# Patient Record
Sex: FEMALE | Race: WHITE | NOT HISPANIC OR LATINO | Employment: UNEMPLOYED | URBAN - METROPOLITAN AREA
[De-identification: names, ages, dates, MRNs, and addresses within clinical notes are randomized per-mention and may not be internally consistent; named-entity substitution may affect disease eponyms.]

---

## 2019-02-21 ENCOUNTER — OFFICE VISIT (OUTPATIENT)
Dept: NEUROSURGERY | Facility: CLINIC | Age: 29
End: 2019-02-21

## 2019-02-21 VITALS — WEIGHT: 235 LBS

## 2019-02-21 DIAGNOSIS — M47.816 FACET ARTHRITIS OF LUMBAR REGION: ICD-10-CM

## 2019-02-21 DIAGNOSIS — M51.36 DEGENERATIVE LUMBAR DISC: Primary | ICD-10-CM

## 2019-02-21 PROCEDURE — 99243 OFF/OP CNSLTJ NEW/EST LOW 30: CPT | Performed by: NEUROLOGICAL SURGERY

## 2019-02-21 RX ORDER — CYCLOBENZAPRINE HCL 10 MG
TABLET ORAL 3 TIMES DAILY PRN
Refills: 0 | COMMUNITY
Start: 2019-02-16 | End: 2019-10-14

## 2019-02-21 RX ORDER — VALACYCLOVIR HYDROCHLORIDE 1 G/1
TABLET, FILM COATED ORAL
Refills: 0 | COMMUNITY
Start: 2018-12-19 | End: 2021-09-30

## 2019-02-21 RX ORDER — DICYCLOMINE HCL 20 MG
TABLET ORAL EVERY 6 HOURS
Refills: 0 | COMMUNITY
Start: 2018-12-10 | End: 2019-10-14

## 2019-02-21 RX ORDER — BUSPIRONE HYDROCHLORIDE 10 MG/1
10 TABLET ORAL 2 TIMES DAILY PRN
Refills: 0 | Status: ON HOLD | COMMUNITY
Start: 2019-01-17 | End: 2019-07-01

## 2019-02-21 RX ORDER — PRENATAL VIT/IRON FUM/FOLIC AC 28MG-0.8MG
1 TABLET ORAL DAILY
Refills: 0 | Status: ON HOLD | COMMUNITY
Start: 2019-01-03 | End: 2019-07-01

## 2019-02-21 RX ORDER — LORATADINE 10 MG/1
TABLET ORAL
Refills: 0 | Status: ON HOLD | COMMUNITY
Start: 2018-12-23 | End: 2019-07-01

## 2019-02-21 RX ORDER — DICLOFENAC SODIUM 75 MG/1
75 TABLET, DELAYED RELEASE ORAL DAILY
Refills: 0 | COMMUNITY
Start: 2019-02-16 | End: 2019-10-14

## 2019-02-21 RX ORDER — GABAPENTIN 600 MG/1
300 TABLET ORAL 3 TIMES DAILY
Refills: 0 | COMMUNITY
Start: 2019-01-17 | End: 2021-09-30

## 2019-02-21 RX ORDER — HYDROCODONE BITARTRATE AND ACETAMINOPHEN 5; 325 MG/1; MG/1
TABLET ORAL
Refills: 0 | Status: ON HOLD | COMMUNITY
Start: 2019-01-08 | End: 2019-07-01

## 2019-02-21 RX ORDER — LEVONORGESTREL AND ETHINYL ESTRADIOL 0.15-0.03
KIT ORAL
Refills: 1 | Status: ON HOLD | COMMUNITY
Start: 2019-02-16 | End: 2019-07-01

## 2019-02-21 RX ORDER — CITALOPRAM 20 MG/1
20 TABLET ORAL DAILY
Refills: 0 | COMMUNITY
Start: 2019-01-14 | End: 2021-09-30

## 2019-02-21 RX ORDER — IBUPROFEN 800 MG/1
800 TABLET ORAL EVERY 8 HOURS PRN
Refills: 0 | Status: ON HOLD | COMMUNITY
Start: 2019-01-15 | End: 2019-07-01

## 2019-02-21 RX ORDER — BUTALBITAL, ACETAMINOPHEN AND CAFFEINE 50; 325; 40 MG/1; MG/1; MG/1
TABLET ORAL EVERY 6 HOURS PRN
Refills: 0 | COMMUNITY
Start: 2019-02-16 | End: 2019-10-14

## 2019-02-21 RX ORDER — CHLORPHENIRAMINE MALEATE 4 MG/1
TABLET ORAL
Refills: 0 | COMMUNITY
Start: 2019-01-03 | End: 2019-08-19

## 2019-02-21 NOTE — PROGRESS NOTES
Subjective   Patient ID: Noni Lipscomb is a 28 y.o. female is being seen for consultation today at the request of RAJEEV Mcgraw*  Chief Complaint: Back pain    History of Present Illness: The patient is a 28-year-old woman with a back pain syndrome who had a lumbar interbody fusion L5-S1 with pedicle screws 10 years ago in 2009 in [place] by Dr. Kp Maya.  The reason for the fusion at age 18 is not listed, but she developed progressive pain somewhere in the past 5 years and began pain management in 2016.  She was in pain management at [facility], but that practice is closed and she now no longer has a pain management provider.  Pain tends to be in her lower back and radiates at times to her hip.  During fluoroscopy for a pain management procedure she was told that 1 of the pedicle screws had a break in it.  She was not able to get back in to her neurosurgeon in [place].    Review of Radiographic Studies:  Lumbar MRI scan shows prior interbody fusion at L5-S1 with an interbody cage and pedicle screws.  L4-5 shows facet arthropathy with a small facet synovial cyst on the left side.  There is no significant stenosis at this time at L4-5.  It is indeterminate whether there is a pseudoarthrosis at L5-S1.    The following portions of the patient's history were reviewed, updated as appropriate and approved: allergies, current medications, past family history, past medical history, past social history, past surgical history, review of systems and problem list.  Review of Systems   Constitutional: Negative for activity change, appetite change, chills, diaphoresis, fatigue, fever and unexpected weight change.   HENT: Negative for congestion, dental problem, drooling, ear discharge, ear pain, facial swelling, hearing loss, mouth sores, nosebleeds, postnasal drip, rhinorrhea, sinus pressure, sneezing, sore throat, tinnitus, trouble swallowing and voice change.    Eyes: Negative for photophobia, pain, discharge,  redness, itching and visual disturbance.   Respiratory: Negative for apnea, cough, choking, chest tightness, shortness of breath, wheezing and stridor.    Cardiovascular: Negative for chest pain, palpitations and leg swelling.   Gastrointestinal: Negative for abdominal distention, abdominal pain, anal bleeding, blood in stool, constipation, diarrhea, nausea, rectal pain and vomiting.   Endocrine: Negative for cold intolerance, heat intolerance, polydipsia, polyphagia and polyuria.   Genitourinary: Negative for decreased urine volume, difficulty urinating, dysuria, enuresis, flank pain, frequency, genital sores, hematuria and urgency.   Musculoskeletal: Positive for back pain. Negative for arthralgias, gait problem, joint swelling, myalgias, neck pain and neck stiffness.   Skin: Negative for color change, pallor, rash and wound.   Allergic/Immunologic: Negative for environmental allergies, food allergies and immunocompromised state.   Neurological: Negative for dizziness, tremors, seizures, syncope, facial asymmetry, speech difficulty, weakness, light-headedness, numbness and headaches.   Hematological: Negative for adenopathy. Does not bruise/bleed easily.   Psychiatric/Behavioral: Negative for agitation, behavioral problems, confusion, decreased concentration, dysphoric mood, hallucinations, self-injury, sleep disturbance and suicidal ideas. The patient is not nervous/anxious and is not hyperactive.    All other systems reviewed and are negative.      Objective     NEUROLOGICAL EXAMINATION:      MENTAL STATUS:  Alert and oriented.  Speech intact.  Recent and remote memory intact.      CRANIAL NERVES:  Cranial nerve II:  Visual fields are full.  Cranial nerves III, IV and VI:  PERRLADC.  Extraocular movements are intact.  Nystagmus is not present.  Cranial nerve V:  Facial sensation is intact.  Cranial nerve VII:  Muscles of facial expression reveal no asymmetry.  Cranial nerve VIII:  Hearing is intact.  Cranial  nerves IX and X:  Palate elevates symmetrically.  Cranial nerve XI:  Shoulder shrug is intact.  Cranial nerve XII:  Tongue is midline without evidence of atrophy or fasciculation.    MUSCULOSKELETAL: Mildly positive SLR on the right.    MOTOR: No weakness of dorsiflexion or plantarflexion on either side.    SENSATION: Intact to touch over the lower extremities.    REFLEXES:  DTR 1+ both knees and both ankles.    Assessment   Chronic back pain syndrome.  PLIF with pedicle screws L5-S1 10 years ago in [place].  Adjacent level L4-5 degenerative facet arthropathy, no segmental instability, no significant stenosis at this time.       Plan   CT of the lumbar spine to determine if there is pseudoarthrosis at L5-S1.  X-ray of the lumbar spine with lateral flexion-extension view to determine if there is any instability at L4-5 or L5-S1, and help define the pedicle screws.  Referral to Dr. Powers in [place] for pain management.       Júnior Anaya MD

## 2019-03-01 ENCOUNTER — TRANSCRIBE ORDERS (OUTPATIENT)
Dept: PAIN MEDICINE | Facility: CLINIC | Age: 29
End: 2019-03-01

## 2019-03-01 DIAGNOSIS — M54.5 LOW BACK PAIN, UNSPECIFIED BACK PAIN LATERALITY, UNSPECIFIED CHRONICITY, WITH SCIATICA PRESENCE UNSPECIFIED: Primary | ICD-10-CM

## 2019-05-23 ENCOUNTER — OFFICE VISIT (OUTPATIENT)
Dept: NEUROSURGERY | Facility: CLINIC | Age: 29
End: 2019-05-23

## 2019-05-23 VITALS
OXYGEN SATURATION: 98 % | HEART RATE: 114 BPM | HEIGHT: 66 IN | RESPIRATION RATE: 20 BRPM | SYSTOLIC BLOOD PRESSURE: 102 MMHG | WEIGHT: 241 LBS | DIASTOLIC BLOOD PRESSURE: 76 MMHG | BODY MASS INDEX: 38.73 KG/M2

## 2019-05-23 DIAGNOSIS — M53.2X6 SPINAL INSTABILITY OF LUMBAR REGION: ICD-10-CM

## 2019-05-23 DIAGNOSIS — M71.30 SYNOVIAL CYST: ICD-10-CM

## 2019-05-23 DIAGNOSIS — S32.009K LUMBAR PSEUDOARTHROSIS: Primary | ICD-10-CM

## 2019-05-23 PROCEDURE — 99213 OFFICE O/P EST LOW 20 MIN: CPT | Performed by: NEUROLOGICAL SURGERY

## 2019-05-23 NOTE — PROGRESS NOTES
Subjective   Noni Lipscomb is a 28 y.o. female who presents for follow up of back and left hip pain.     The patient is a 36-year-old woman who had an L5-S1 fusion in [place] in 2009.  She has had progressive back pain with right hip pain for 5 years and began having pain management in 2016.  In the past year she has developed left hip pain radiating to her left leg.    Lumbar MRI scan previously reviewed showed interbody fusion L5-S1 with pedicle screws, and L4-5 facet arthropathy with a small facet synovial cyst on the left but no apparent significant stenosis at L4-5 this time.    Lumbar CT scan was done and reviewed on this visit along with lumbar flexion-extension x-rays.  The lumbar x-rays show sacral screw fracture bilaterally at the entry point of the screw into the sacrum.  There is a single interbody cage placed from a left-sided approach.  There is no significant calcification within the disc space, although there is some bony density within the interior of the cage, but radiographically I think this may represent a pseudoarthrosis.  The lateral flexion-extension views do not show any visible movement at the lumbosacral level.    A referral was made to Dr. Powers in [place] for pain management, but she decided not to have this because she has not had a good experience with spinal injections previously.    The following portions of the patient's history were reviewed, updated as appropriate and approved: allergies, current medications, past family history, past medical history, past social history, past surgical history, review of systems and problem list.     Review of Systems   Constitutional: Negative for activity change, appetite change, chills, diaphoresis, fatigue, fever and unexpected weight change.   HENT: Negative for congestion, dental problem, drooling, ear discharge, ear pain, facial swelling, hearing loss, mouth sores, nosebleeds, postnasal drip, rhinorrhea, sinus pressure, sneezing, sore  throat, tinnitus, trouble swallowing and voice change.    Eyes: Negative for photophobia, pain, discharge, redness, itching and visual disturbance.   Respiratory: Negative for apnea, cough, choking, chest tightness, shortness of breath, wheezing and stridor.    Cardiovascular: Negative for chest pain, palpitations and leg swelling.   Gastrointestinal: Positive for abdominal pain and nausea. Negative for abdominal distention, anal bleeding, blood in stool, constipation, diarrhea, rectal pain and vomiting.   Endocrine: Negative for cold intolerance, heat intolerance, polydipsia, polyphagia and polyuria.   Genitourinary: Negative for decreased urine volume, difficulty urinating, dysuria, enuresis, flank pain, frequency, genital sores, hematuria and urgency.   Musculoskeletal: Positive for back pain. Negative for arthralgias, gait problem, joint swelling, myalgias, neck pain and neck stiffness.   Skin: Negative for color change, pallor, rash and wound.   Allergic/Immunologic: Negative for environmental allergies, food allergies and immunocompromised state.   Neurological: Positive for numbness and headaches. Negative for dizziness, tremors, seizures, syncope, facial asymmetry, speech difficulty, weakness and light-headedness.   Hematological: Negative for adenopathy. Does not bruise/bleed easily.   Psychiatric/Behavioral: Negative for agitation, behavioral problems, confusion, decreased concentration, dysphoric mood, hallucinations, self-injury, sleep disturbance and suicidal ideas. The patient is not nervous/anxious and is not hyperactive.        Objective    NEUROLOGICAL EXAMINATION:    Mildly positive SLR bilaterally.  No motor or sensory loss.  1+ reflexes in both knees and ankles.    Assessment   Possible pseudoarthrosis L5-S1.  Possible left L5 versus S1 radiculopathy.       Plan   Lumbar myelogram.  The objective is to determine whether there is any left sided L5 or S1 nerve root compression, and to help  determine whether the pseudoarthrosis which is possibly present may be the cause of the ongoing back pain.  I have explained that a revision fusion surgery could be done but the chance of success in long-term relief of back pain is only modest, and would require wide exposure of L5-S1 with revision of her S1 pedicle screws and posterior lateral fusion, utilizing BMP this time to ensure adequate bony fusion development.       Júnior Anaya MD

## 2019-06-17 PROBLEM — M71.30 SYNOVIAL CYST: Status: ACTIVE | Noted: 2019-06-17

## 2019-07-01 ENCOUNTER — HOSPITAL ENCOUNTER (OUTPATIENT)
Facility: HOSPITAL | Age: 29
Discharge: HOME | End: 2019-07-01
Attending: RADIOLOGY | Admitting: NEUROLOGICAL SURGERY

## 2019-07-01 VITALS
HEART RATE: 108 BPM | BODY MASS INDEX: 40.65 KG/M2 | RESPIRATION RATE: 16 BRPM | TEMPERATURE: 97.7 F | SYSTOLIC BLOOD PRESSURE: 143 MMHG | HEIGHT: 65 IN | WEIGHT: 244 LBS | DIASTOLIC BLOOD PRESSURE: 74 MMHG | OXYGEN SATURATION: 98 %

## 2019-07-01 DIAGNOSIS — M71.30 SYNOVIAL CYST: ICD-10-CM

## 2019-07-01 DIAGNOSIS — M53.2X6 SPINAL INSTABILITY OF LUMBAR REGION: ICD-10-CM

## 2019-07-01 DIAGNOSIS — S32.009K LUMBAR PSEUDOARTHROSIS: ICD-10-CM

## 2019-07-01 LAB
B-HCG UR QL: POSITIVE
HCG INTACT+B SERPL-ACNC: 487.9 MIU/ML

## 2019-07-01 PROCEDURE — 0 IOPAMIDOL 41 % SOLUTION: Performed by: RADIOLOGY

## 2019-07-01 PROCEDURE — 81025 URINE PREGNANCY TEST: CPT | Performed by: RADIOLOGY

## 2019-07-01 PROCEDURE — 81025 URINE PREGNANCY TEST: CPT

## 2019-07-01 PROCEDURE — 84702 CHORIONIC GONADOTROPIN TEST: CPT | Performed by: RADIOLOGY

## 2019-07-01 PROCEDURE — 62304 MYELOGRAPHY LUMBAR INJECTION: CPT | Performed by: RADIOLOGY

## 2019-07-01 RX ORDER — OXYCODONE AND ACETAMINOPHEN 7.5; 3 MG/1; MG/1
1 TABLET ORAL EVERY 8 HOURS PRN
COMMUNITY
End: 2021-09-30

## 2019-07-01 RX ORDER — LIDOCAINE HYDROCHLORIDE 10 MG/ML
INJECTION, SOLUTION EPIDURAL; INFILTRATION; INTRACAUDAL; PERINEURAL AS NEEDED
Status: DISCONTINUED | OUTPATIENT
Start: 2019-07-01 | End: 2019-07-01 | Stop reason: HOSPADM

## 2019-07-01 NOTE — H&P
"NAME: ASHLEY CHAPMAN  : 1990  PCP: Montrell Villegas MD  Attending MD: No att. providers found    Date of Admission:  2019  Date of Service: 2019    History of Present Illness:  28 y.o.  female with back and left hip pain.  She has had a prior L5/S1 fusion in [place] in .  She has had right hip pain for the past 5 years or so, but has more recently developed a left hip pain, radiating into her leg.    Past Medical History:  History reviewed. No pertinent past medical history.    Past Surgical History:  Past Surgical History:   Procedure Laterality Date   •  SECTION      x 3   • DILATATION AND CURETTAGE     • LUMBAR DISC SURGERY     • TONSILLECTOMY     • WISDOM TOOTH EXTRACTION           Medications  No medications prior to admission.       Allergies:  Allergies   Allergen Reactions   • Morphine Other (See Comments)     Previous reaction   • Latex Rash       Social Hx:  Social History     Socioeconomic History   • Marital status:      Spouse name: Not on file   • Number of children: Not on file   • Years of education: Not on file   • Highest education level: Not on file   Tobacco Use   • Smoking status: Current Every Day Smoker     Packs/day: 0.50     Types: Cigarettes   • Smokeless tobacco: Never Used   Substance and Sexual Activity   • Drug use: No       Family Hx:  History reviewed. No pertinent family history.    Review of Imaging:  Per Dr. Anaya's note:  \"Lumbar MRI scan previously reviewed showed interbody fusion L5-S1 with pedicle screws, and L4-5 facet arthropathy with a small facet synovial cyst on the left but no apparent significant stenosis at L4-5 this time    Laboratory Result:  No results found for: WBC, HGB, HCT, MCV, PLT  No results found for: GLUCOSE, CALCIUM, NA, K, CO2, CL, BUN, CREATININE, EGFRIFAFRI, EGFRIFNONA, BCR, ANIONGAP  No results found for: HGBA1C  No results found for: CHOL, CHLPL, TRIG, HDL, LDL, LDLDIRECT    Physical Examination:  Vitals:    " 07/01/19 1122   BP: 143/74   Pulse: 108   Resp: 16   Temp:    SpO2: 98%        General Appearance:   Well developed, well nourished, well groomed, alert, and cooperative.  Cardiovascular: Regular rate and rhythm.     Neurological examination:  Mildly positive SLR bilaterally.  No motor or sensory loss.  1+ reflexes in both knees and ankles.      Diagnoses/Plan:    Ms. Lipscomb is a 28 y.o. female new, progressive left hip pain radiating down the left leg.  She has possible pseudoarthrosis at the L5/S1 level, and presents for lumbar myelography to evaluate her radicular complaints for possible intervention.

## 2019-07-17 ENCOUNTER — TRANSCRIBE ORDERS (OUTPATIENT)
Dept: WOMENS IMAGING | Facility: HOSPITAL | Age: 29
End: 2019-07-17

## 2019-07-17 DIAGNOSIS — Z98.1 HISTORY OF LUMBAR FUSION: ICD-10-CM

## 2019-07-17 DIAGNOSIS — O09.891 MEDICATION EXPOSURE DURING FIRST TRIMESTER OF PREGNANCY: Primary | ICD-10-CM

## 2019-07-17 DIAGNOSIS — Z98.891 HISTORY OF C-SECTION: ICD-10-CM

## 2019-08-19 ENCOUNTER — TELEPHONE (OUTPATIENT)
Dept: WOMENS IMAGING | Facility: HOSPITAL | Age: 29
End: 2019-08-19

## 2019-08-19 ENCOUNTER — HOSPITAL ENCOUNTER (OUTPATIENT)
Dept: WOMENS IMAGING | Facility: HOSPITAL | Age: 29
Discharge: HOME | End: 2019-08-19
Admitting: NURSE PRACTITIONER

## 2019-08-19 ENCOUNTER — OFFICE VISIT (OUTPATIENT)
Dept: OBSTETRICS AND GYNECOLOGY | Facility: HOSPITAL | Age: 29
End: 2019-08-19

## 2019-08-19 VITALS
BODY MASS INDEX: 42.03 KG/M2 | DIASTOLIC BLOOD PRESSURE: 62 MMHG | WEIGHT: 246.2 LBS | HEIGHT: 64 IN | SYSTOLIC BLOOD PRESSURE: 107 MMHG

## 2019-08-19 DIAGNOSIS — Z98.1 HISTORY OF LUMBAR FUSION: ICD-10-CM

## 2019-08-19 DIAGNOSIS — O34.219 PREVIOUS CESAREAN DELIVERY AFFECTING PREGNANCY: Primary | ICD-10-CM

## 2019-08-19 DIAGNOSIS — M54.5 CHRONIC LOW BACK PAIN, UNSPECIFIED BACK PAIN LATERALITY, WITH SCIATICA PRESENCE UNSPECIFIED: ICD-10-CM

## 2019-08-19 DIAGNOSIS — Z34.90 PREGNANCY, UNSPECIFIED GESTATIONAL AGE: ICD-10-CM

## 2019-08-19 DIAGNOSIS — Z98.891 HISTORY OF C-SECTION: ICD-10-CM

## 2019-08-19 DIAGNOSIS — E66.01 MORBID OBESITY WITH BMI OF 40.0-44.9, ADULT (HCC): ICD-10-CM

## 2019-08-19 DIAGNOSIS — O09.891 MEDICATION EXPOSURE DURING FIRST TRIMESTER OF PREGNANCY: ICD-10-CM

## 2019-08-19 DIAGNOSIS — G89.29 CHRONIC LOW BACK PAIN, UNSPECIFIED BACK PAIN LATERALITY, WITH SCIATICA PRESENCE UNSPECIFIED: ICD-10-CM

## 2019-08-19 PROBLEM — M54.50 CHRONIC LOW BACK PAIN: Status: ACTIVE | Noted: 2019-08-19

## 2019-08-19 PROCEDURE — 76801 OB US < 14 WKS SINGLE FETUS: CPT

## 2019-08-19 PROCEDURE — 76801 OB US < 14 WKS SINGLE FETUS: CPT | Performed by: OBSTETRICS & GYNECOLOGY

## 2019-08-19 NOTE — TELEPHONE ENCOUNTER
Left voice message informing patient of appointment with Dr. Cole Wednesday, Augsust 21st @ 9 am, [address], [address]. Advised her to arrive by 8:30 am to complete paperwork and registration. Also provided his phone number ([phone]).

## 2019-08-19 NOTE — PROGRESS NOTES
"Next OB visit is 9/27/2019. Reports PCP was prescribing narcotics and Gabapentin \"but he got shut down\". States Ms. Palencia has prescribed Gabapentin. She has weaned off Percocet per Dr. Au's instructions and has been taking some Ibuprofen for chronic back pain. Sees Dr. Anaya for neurosurgical care. States is here because she needs management of chronic back pain.   "

## 2019-08-19 NOTE — PROGRESS NOTES
Spoke with Pao in Dr. Cole's office regarding referral for chronic back pain. She will check with Dr. Cole to see if they can see her any sooner than their next available appointment in October and let us know something.

## 2019-08-22 ENCOUNTER — TELEPHONE (OUTPATIENT)
Dept: WOMENS IMAGING | Facility: HOSPITAL | Age: 29
End: 2019-08-22

## 2019-08-22 NOTE — TELEPHONE ENCOUNTER
Returned pt's call regarding missing her referral appt. Pt stated that she has already scheduled an appt for Oct but will plan on calling that office weekly to see if there are any appt cancellations.

## 2019-08-22 NOTE — TELEPHONE ENCOUNTER
----- Message from James Hurt sent at 8/22/2019  8:34 AM EDT -----  Regarding: missed appointment  Contact: [phone]  Patient called missed call from Dr. Cole's office and missed appointment, she called to reschedule and they don't have anything until 10-23-19 at 10 am.

## 2019-10-14 ENCOUNTER — OFFICE VISIT (OUTPATIENT)
Dept: OBSTETRICS AND GYNECOLOGY | Facility: HOSPITAL | Age: 29
End: 2019-10-14

## 2019-10-14 ENCOUNTER — HOSPITAL ENCOUNTER (OUTPATIENT)
Dept: WOMENS IMAGING | Facility: HOSPITAL | Age: 29
Discharge: HOME | End: 2019-10-14
Admitting: OBSTETRICS & GYNECOLOGY

## 2019-10-14 VITALS — DIASTOLIC BLOOD PRESSURE: 51 MMHG | SYSTOLIC BLOOD PRESSURE: 108 MMHG | WEIGHT: 251 LBS | BODY MASS INDEX: 43.77 KG/M2

## 2019-10-14 DIAGNOSIS — M54.50 CHRONIC LOW BACK PAIN: ICD-10-CM

## 2019-10-14 DIAGNOSIS — O34.219 PREVIOUS CESAREAN DELIVERY AFFECTING PREGNANCY: ICD-10-CM

## 2019-10-14 DIAGNOSIS — Z34.90 PREGNANCY, UNSPECIFIED GESTATIONAL AGE: ICD-10-CM

## 2019-10-14 DIAGNOSIS — E66.01 MORBID OBESITY WITH BMI OF 40.0-44.9, ADULT (HCC): Primary | ICD-10-CM

## 2019-10-14 DIAGNOSIS — E66.01 MORBID OBESITY WITH BMI OF 40.0-44.9, ADULT (HCC): ICD-10-CM

## 2019-10-14 DIAGNOSIS — G89.29 CHRONIC LOW BACK PAIN: ICD-10-CM

## 2019-10-14 PROCEDURE — 76811 OB US DETAILED SNGL FETUS: CPT

## 2019-10-14 PROCEDURE — 76811 OB US DETAILED SNGL FETUS: CPT | Performed by: OBSTETRICS & GYNECOLOGY

## 2019-10-14 NOTE — PROGRESS NOTES
Documentation of the ultasound findings, images, and interpretations with be available in the patient's Viewpoint report located in the Chart Review Imaging tab in [facility].

## 2019-10-14 NOTE — PROGRESS NOTES
Denies problems. Reports had NIPS with normal results. Next OB visit is 10/17/2019. States Dr. Horn is prescribing Percocet for her back pain until she can see Dr. Cole on 10/23/2019. Has decreased Neurontin dose to 300 mg once daily.

## 2019-12-09 ENCOUNTER — HOSPITAL ENCOUNTER (OUTPATIENT)
Dept: WOMENS IMAGING | Facility: HOSPITAL | Age: 29
Discharge: HOME | End: 2019-12-09
Admitting: OBSTETRICS & GYNECOLOGY

## 2019-12-09 ENCOUNTER — OFFICE VISIT (OUTPATIENT)
Dept: OBSTETRICS AND GYNECOLOGY | Facility: HOSPITAL | Age: 29
End: 2019-12-09

## 2019-12-09 VITALS — DIASTOLIC BLOOD PRESSURE: 53 MMHG | WEIGHT: 253.2 LBS | BODY MASS INDEX: 44.15 KG/M2 | SYSTOLIC BLOOD PRESSURE: 111 MMHG

## 2019-12-09 DIAGNOSIS — E66.01 MORBID OBESITY WITH BMI OF 40.0-44.9, ADULT (HCC): ICD-10-CM

## 2019-12-09 DIAGNOSIS — M54.50 CHRONIC LOW BACK PAIN, UNSPECIFIED BACK PAIN LATERALITY, UNSPECIFIED WHETHER SCIATICA PRESENT: ICD-10-CM

## 2019-12-09 DIAGNOSIS — O34.219 PREVIOUS CESAREAN DELIVERY AFFECTING PREGNANCY: ICD-10-CM

## 2019-12-09 DIAGNOSIS — Z34.90 PREGNANCY, UNSPECIFIED GESTATIONAL AGE: ICD-10-CM

## 2019-12-09 DIAGNOSIS — E66.01 MORBID OBESITY WITH BMI OF 40.0-44.9, ADULT (HCC): Primary | ICD-10-CM

## 2019-12-09 DIAGNOSIS — G89.29 CHRONIC LOW BACK PAIN, UNSPECIFIED BACK PAIN LATERALITY, UNSPECIFIED WHETHER SCIATICA PRESENT: ICD-10-CM

## 2019-12-09 PROCEDURE — 76816 OB US FOLLOW-UP PER FETUS: CPT | Performed by: OBSTETRICS & GYNECOLOGY

## 2019-12-09 PROCEDURE — 76816 OB US FOLLOW-UP PER FETUS: CPT

## 2019-12-09 NOTE — PROGRESS NOTES
Documentation of the ultasound findings, images, and interpretations with be available in the patient's Viewpoint report located in the Chart Review Imaging tab in [place].

## 2019-12-09 NOTE — PROGRESS NOTES
Denies problems other than chronic back pain issues and current URI symptoms (was seen by Urgent Care). States she had a consultation with Dr. Cole regarding back pain and he told her he couldn't do anything for her while she is pregnant. Reports that Dr. Horn is continuing to prescribe limited doses of Percocet but she has stopped Celexa and Gabapentin.

## 2020-06-15 ENCOUNTER — OFFICE VISIT (OUTPATIENT)
Dept: NEUROSURGERY | Facility: CLINIC | Age: 30
End: 2020-06-15

## 2020-06-15 VITALS
SYSTOLIC BLOOD PRESSURE: 126 MMHG | HEIGHT: 64 IN | DIASTOLIC BLOOD PRESSURE: 78 MMHG | TEMPERATURE: 97.3 F | BODY MASS INDEX: 40.29 KG/M2 | WEIGHT: 236 LBS

## 2020-06-15 DIAGNOSIS — E66.01 CLASS 3 SEVERE OBESITY DUE TO EXCESS CALORIES WITHOUT SERIOUS COMORBIDITY WITH BODY MASS INDEX (BMI) OF 40.0 TO 44.9 IN ADULT (HCC): ICD-10-CM

## 2020-06-15 DIAGNOSIS — M53.2X6 SPINAL INSTABILITY OF LUMBAR REGION: Primary | ICD-10-CM

## 2020-06-15 PROCEDURE — 99214 OFFICE O/P EST MOD 30 MIN: CPT | Performed by: PHYSICIAN ASSISTANT

## 2020-06-15 RX ORDER — DICLOFENAC SODIUM 75 MG/1
75 TABLET, DELAYED RELEASE ORAL 2 TIMES DAILY
COMMUNITY
End: 2021-09-30

## 2020-06-15 NOTE — H&P (VIEW-ONLY)
Patient: Noni Lipscomb  : 1990  Gender: female    Primary Care Provider: Berta Ferrer APRN      Chief Complaint:   Chief Complaint   Patient presents with   • Back Pain       History of Present Illness:  Noni Lipscomb is a 29-year-old woman with a history of L5-S1 fusion in  in [place].  She saw Dr. Anaya in May 2019 for left hip and leg pain.  At this time MRI demonstrated interbody fusion L5-S1, L4-5 facet arthropathy without significant stenosis.  Lumbar CT and plain films were reviewed and demonstrated a sacral screw fracture bilaterally at the entry point of the screw in the sacrum as well as some bone density within the inferior of the cage which Dr. Anaya felt was a possible pseudoarthrosis.  Lumbar myelogram was recommended although patient had a positive pregnancy test upon admission for the study.  She presents today for follow-up regarding these issues.    Presently Ms. Lipscomb reports that her symptoms are located in her sacral region bilaterally and radiate to both legs posteriorly.  She reports some numbness distal to the knee.  She denies groin numbness or bowel or bladder dysfunction.  She feels that her symptoms worsened after the birth of her child.  She takes Percocet, diclofenac, gabapentin.  She is followed by [facility] for pain management.  She has recently started smoking.  She has no new imaging to date.      Past Medical and Surgical History:  Past Medical History:   Diagnosis Date   • Anxiety 2019   • Asthma    • Chronic back pain 2019    s/p lumbar fusion   • Chronic narcotic use 2019    Percocet for chronic back pain   • History of abnormal cervical Pap smear    • Migraines     since 2014   • Obesity, Class III, BMI 40-49.9 (morbid obesity) (CMS/HCC) 2019   • Spondylolisthesis      Past Surgical History:   Procedure Laterality Date   • CERVICAL BIOPSY     •  SECTION      x 3   • DILATATION AND CURETTAGE     • DILATATION AND CURETTAGE   "01/07/2019    Incomplete SAB   • INTERVENTIONAL RADIOLOGY PROCEDURE N/A 7/1/2019    Procedure: myelogram lumbar spine;  Surgeon: Elkin Benz MD;  Location: [facility] INVASIVE LOCATION;  Service: Interventional Radiology   • LUMBAR DISC SURGERY     • TONSILLECTOMY     • WISDOM TOOTH EXTRACTION         Current Medications:    Current Outpatient Medications:   •  citalopram (CeleXA) 20 MG tablet, 20 mg Daily., Disp: , Rfl: 0  •  diclofenac (VOLTAREN) 75 MG EC tablet, Take 75 mg by mouth 2 (Two) Times a Day., Disp: , Rfl:   •  oxyCODONE-acetaminophen (LYNOX) 7.5-300 MG per tablet, Take 1 tablet by mouth Every 8 (Eight) Hours As Needed., Disp: , Rfl:   •  gabapentin (NEURONTIN) 600 MG tablet, Take 300 mg by mouth Daily. Only takes BID, Disp: , Rfl: 0  •  PRENATAL GUMMY VITAMIN, Chew 2 each Daily., Disp: , Rfl:   •  valACYclovir (VALTREX) 1000 MG tablet, take 2 tablets by mouth every 12 hours for 2 DOSES if needed for fever BLISTER, Disp: , Rfl: 0    Allergies:  Allergies   Allergen Reactions   • Morphine Hives and GI Intolerance     Duramorph; states is able to tolerate regular Morphine   • Latex Rash       Review of Systems:  Review of Systems   Genitourinary: Positive for pelvic pain.   Musculoskeletal: Positive for arthralgias and back pain.   Neurological: Positive for headache.   All other systems reviewed and are negative.        Physical Examination:  Vitals:    06/15/20 1326   BP: 126/78   Temp: 97.3 °F (36.3 °C)   Weight: 107 kg (236 lb)   Height: 161.3 cm (63.5\")       Physical Exam   Constitutional: She is oriented to person, place, and time. She appears well-developed and well-nourished. No distress.   Neck: Normal range of motion. Neck supple.   Musculoskeletal: Normal range of motion. She exhibits no edema, tenderness or deformity.   Neurological: She is alert and oriented to person, place, and time. She has normal strength. She displays no atrophy. A sensory deficit is present. Coordination and gait " normal.   Reflex Scores:       Bicep reflexes are 1+ on the right side and 1+ on the left side.       Brachioradialis reflexes are 1+ on the right side and 1+ on the left side.       Patellar reflexes are 1+ on the right side and 1+ on the left side.       Achilles reflexes are 1+ on the right side and 1+ on the left side.  Subjective numbness distal to the knee in the right lower extremity sparing the foot   Skin: Skin is warm and dry. She is not diaphoretic.   Psychiatric: She has a normal mood and affect.   Positive SLR on the left    Imaging Review:  Per Dr. Anaya's previous documentation: Lumbar MRI demonstrates interbody fusion at L5-S1 with pedicle screws, L4-5 facet arthropathy with a small synovial cyst on the left with no significant stenosis  Lumbar CT and plain films show sacral screw fractures bilaterally at the entry point of the screw into the sacrum with a single interbody cage placed from the left-sided approach.  Lateral flexion-extension views do not show any visible movement of the lumbosacral level.    Assessment:  1.  Low back pain with bilateral sciatica  2.  History of lumbar fusion L5-S1      Plan:  Noni Lipscomb is seen today for reevaluation regarding possible pseudoarthrosis and sacral screw fractures within her L5-S1 construct which was placed in 2009 in [place].  I have reviewed this with Dr. Hagan who would like to proceed with a lumbar myelogram and new flexion-extension plain films.  Patient will follow-up with Dr. Hagan following the studies.  Patient was directed to contact our office with any concerns in the interim.          Lalita Zamarripa PA-C

## 2020-06-15 NOTE — PROGRESS NOTES
Patient: Noni Lipscomb  : 1990  Gender: female    Primary Care Provider: Berta Ferrer APRN      Chief Complaint:   Chief Complaint   Patient presents with   • Back Pain       History of Present Illness:  Noni Lipscomb is a 29-year-old woman with a history of L5-S1 fusion in  in [place].  She saw Dr. Anaya in May 2019 for left hip and leg pain.  At this time MRI demonstrated interbody fusion L5-S1, L4-5 facet arthropathy without significant stenosis.  Lumbar CT and plain films were reviewed and demonstrated a sacral screw fracture bilaterally at the entry point of the screw in the sacrum as well as some bone density within the inferior of the cage which Dr. Anaya felt was a possible pseudoarthrosis.  Lumbar myelogram was recommended although patient had a positive pregnancy test upon admission for the study.  She presents today for follow-up regarding these issues.    Presently Ms. Lipscomb reports that her symptoms are located in her sacral region bilaterally and radiate to both legs posteriorly.  She reports some numbness distal to the knee.  She denies groin numbness or bowel or bladder dysfunction.  She feels that her symptoms worsened after the birth of her child.  She takes Percocet, diclofenac, gabapentin.  She is followed by [facility] for pain management.  She has recently started smoking.  She has no new imaging to date.      Past Medical and Surgical History:  Past Medical History:   Diagnosis Date   • Anxiety 2019   • Asthma    • Chronic back pain 2019    s/p lumbar fusion   • Chronic narcotic use 2019    Percocet for chronic back pain   • History of abnormal cervical Pap smear    • Migraines     since 2014   • Obesity, Class III, BMI 40-49.9 (morbid obesity) (CMS/HCC) 2019   • Spondylolisthesis      Past Surgical History:   Procedure Laterality Date   • CERVICAL BIOPSY     •  SECTION      x 3   • DILATATION AND CURETTAGE     • DILATATION AND CURETTAGE   "01/07/2019    Incomplete SAB   • INTERVENTIONAL RADIOLOGY PROCEDURE N/A 7/1/2019    Procedure: myelogram lumbar spine;  Surgeon: Elkin Benz MD;  Location: [facility] INVASIVE LOCATION;  Service: Interventional Radiology   • LUMBAR DISC SURGERY     • TONSILLECTOMY     • WISDOM TOOTH EXTRACTION         Current Medications:    Current Outpatient Medications:   •  citalopram (CeleXA) 20 MG tablet, 20 mg Daily., Disp: , Rfl: 0  •  diclofenac (VOLTAREN) 75 MG EC tablet, Take 75 mg by mouth 2 (Two) Times a Day., Disp: , Rfl:   •  oxyCODONE-acetaminophen (LYNOX) 7.5-300 MG per tablet, Take 1 tablet by mouth Every 8 (Eight) Hours As Needed., Disp: , Rfl:   •  gabapentin (NEURONTIN) 600 MG tablet, Take 300 mg by mouth Daily. Only takes BID, Disp: , Rfl: 0  •  PRENATAL GUMMY VITAMIN, Chew 2 each Daily., Disp: , Rfl:   •  valACYclovir (VALTREX) 1000 MG tablet, take 2 tablets by mouth every 12 hours for 2 DOSES if needed for fever BLISTER, Disp: , Rfl: 0    Allergies:  Allergies   Allergen Reactions   • Morphine Hives and GI Intolerance     Duramorph; states is able to tolerate regular Morphine   • Latex Rash       Review of Systems:  Review of Systems   Genitourinary: Positive for pelvic pain.   Musculoskeletal: Positive for arthralgias and back pain.   Neurological: Positive for headache.   All other systems reviewed and are negative.        Physical Examination:  Vitals:    06/15/20 1326   BP: 126/78   Temp: 97.3 °F (36.3 °C)   Weight: 107 kg (236 lb)   Height: 161.3 cm (63.5\")       Physical Exam   Constitutional: She is oriented to person, place, and time. She appears well-developed and well-nourished. No distress.   Neck: Normal range of motion. Neck supple.   Musculoskeletal: Normal range of motion. She exhibits no edema, tenderness or deformity.   Neurological: She is alert and oriented to person, place, and time. She has normal strength. She displays no atrophy. A sensory deficit is present. Coordination and gait " normal.   Reflex Scores:       Bicep reflexes are 1+ on the right side and 1+ on the left side.       Brachioradialis reflexes are 1+ on the right side and 1+ on the left side.       Patellar reflexes are 1+ on the right side and 1+ on the left side.       Achilles reflexes are 1+ on the right side and 1+ on the left side.  Subjective numbness distal to the knee in the right lower extremity sparing the foot   Skin: Skin is warm and dry. She is not diaphoretic.   Psychiatric: She has a normal mood and affect.   Positive SLR on the left    Imaging Review:  Per Dr. Anaya's previous documentation: Lumbar MRI demonstrates interbody fusion at L5-S1 with pedicle screws, L4-5 facet arthropathy with a small synovial cyst on the left with no significant stenosis  Lumbar CT and plain films show sacral screw fractures bilaterally at the entry point of the screw into the sacrum with a single interbody cage placed from the left-sided approach.  Lateral flexion-extension views do not show any visible movement of the lumbosacral level.    Assessment:  1.  Low back pain with bilateral sciatica  2.  History of lumbar fusion L5-S1      Plan:  Noni Lipscomb is seen today for reevaluation regarding possible pseudoarthrosis and sacral screw fractures within her L5-S1 construct which was placed in 2009 in [place].  I have reviewed this with Dr. Hagan who would like to proceed with a lumbar myelogram and new flexion-extension plain films.  Patient will follow-up with Dr. Hagan following the studies.  Patient was directed to contact our office with any concerns in the interim.          Lalita Zamarripa PA-C

## 2020-06-25 ENCOUNTER — HOSPITAL ENCOUNTER (OUTPATIENT)
Facility: HOSPITAL | Age: 30
Setting detail: HOSPITAL OUTPATIENT SURGERY
Discharge: HOME | End: 2020-06-25
Attending: RADIOLOGY | Admitting: RADIOLOGY

## 2020-06-25 ENCOUNTER — APPOINTMENT (OUTPATIENT)
Dept: CT IMAGING | Facility: HOSPITAL | Age: 30
End: 2020-06-25

## 2020-06-25 VITALS
OXYGEN SATURATION: 94 % | DIASTOLIC BLOOD PRESSURE: 109 MMHG | BODY MASS INDEX: 39.23 KG/M2 | RESPIRATION RATE: 16 BRPM | TEMPERATURE: 97.2 F | HEART RATE: 117 BPM | WEIGHT: 235.45 LBS | HEIGHT: 65 IN | SYSTOLIC BLOOD PRESSURE: 144 MMHG

## 2020-06-25 DIAGNOSIS — M53.2X6 SPINAL INSTABILITY OF LUMBAR REGION: ICD-10-CM

## 2020-06-25 LAB — B-HCG UR QL: NEGATIVE

## 2020-06-25 PROCEDURE — 81025 URINE PREGNANCY TEST: CPT | Performed by: RADIOLOGY

## 2020-06-25 PROCEDURE — 0 IOPAMIDOL 41 % SOLUTION: Performed by: RADIOLOGY

## 2020-06-25 PROCEDURE — 62304 MYELOGRAPHY LUMBAR INJECTION: CPT | Performed by: RADIOLOGY

## 2020-06-25 PROCEDURE — 72132 CT LUMBAR SPINE W/DYE: CPT

## 2020-06-25 RX ORDER — ALBUTEROL SULFATE 90 UG/1
2 AEROSOL, METERED RESPIRATORY (INHALATION) EVERY 4 HOURS PRN
COMMUNITY
End: 2021-09-30

## 2020-06-25 RX ORDER — OXYCODONE AND ACETAMINOPHEN 7.5; 325 MG/1; MG/1
1 TABLET ORAL ONCE AS NEEDED
Status: COMPLETED | OUTPATIENT
Start: 2020-06-25 | End: 2020-06-25

## 2020-06-25 RX ADMIN — OXYCODONE HYDROCHLORIDE AND ACETAMINOPHEN 1 TABLET: 7.5; 325 TABLET ORAL at 08:01

## 2020-07-07 ENCOUNTER — OFFICE VISIT (OUTPATIENT)
Dept: NEUROSURGERY | Facility: CLINIC | Age: 30
End: 2020-07-07

## 2020-07-07 VITALS — WEIGHT: 242.4 LBS | HEIGHT: 65 IN | BODY MASS INDEX: 40.39 KG/M2 | TEMPERATURE: 97.5 F

## 2020-07-07 DIAGNOSIS — M53.2X6 SPINAL INSTABILITY OF LUMBAR REGION: Primary | ICD-10-CM

## 2020-07-07 DIAGNOSIS — M47.816 FACET ARTHRITIS OF LUMBAR REGION: ICD-10-CM

## 2020-07-07 DIAGNOSIS — M54.50 CHRONIC LOW BACK PAIN, UNSPECIFIED BACK PAIN LATERALITY, UNSPECIFIED WHETHER SCIATICA PRESENT: ICD-10-CM

## 2020-07-07 DIAGNOSIS — G89.29 CHRONIC LOW BACK PAIN, UNSPECIFIED BACK PAIN LATERALITY, UNSPECIFIED WHETHER SCIATICA PRESENT: ICD-10-CM

## 2020-07-07 PROCEDURE — 99213 OFFICE O/P EST LOW 20 MIN: CPT | Performed by: NEUROLOGICAL SURGERY

## 2020-07-07 NOTE — PROGRESS NOTES
Noni Lipscomb  1990  [id number]                        CHIEF COMPLAINT:  [Back pain]         MEDICAL HISTORY SINCE LAST ENCOUNTER:  [This 29-year-old female reports today for further discussion and review of her diagnostic studies.  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  Her pain is primarily axial.  She has no true radiculopathy.  She had a myelogram performed and returns today for further discussion.  She describes her pain as being primarily in the sacroiliac joint region.]           Past Medical History:   Diagnosis Date   • Anxiety 2019   • Asthma    • Chronic back pain 2019    s/p lumbar fusion   • Chronic narcotic use 2019    Percocet for chronic back pain   • History of abnormal cervical Pap smear    • Migraines     since    • Obesity, Class III, BMI 40-49.9 (morbid obesity) (CMS/[facility]) 2019   • Spondylolisthesis               Past Surgical History:   Procedure Laterality Date   • CERVICAL BIOPSY     •  SECTION      x 3   • DILATATION AND CURETTAGE     • DILATATION AND CURETTAGE  2019    Incomplete SAB   • INTERVENTIONAL RADIOLOGY PROCEDURE N/A 2019    Procedure: myelogram lumbar spine;  Surgeon: Elkin Benz MD;  Location:  [facility] INVASIVE LOCATION;  Service: Interventional Radiology   • INTERVENTIONAL RADIOLOGY PROCEDURE N/A 2020    Procedure: IR myelogram lumbar with fluoroscopy;  Surgeon: Elkin Benz MD;  Location:  [facility] CATH INVASIVE LOCATION;  Service: Interventional Radiology;  Laterality: N/A;   • LUMBAR DISC SURGERY     • TONSILLECTOMY     • WISDOM TOOTH EXTRACTION                Family History   Problem Relation Age of Onset   • Hyperlipidemia Mother    • Heart disease Paternal Grandfather               Social History     Socioeconomic History   • Marital status:      Spouse name: Not on file   • Number of children: Not on file   • Years of education: Not on  file   • Highest education level: Not on file   Tobacco Use   • Smoking status: Current Some Day Smoker     Packs/day: 0.00     Types: Cigarettes     Last attempt to quit: 2019     Years since quittin.9   • Smokeless tobacco: Never Used   • Tobacco comment: 1-3pks a week   Substance and Sexual Activity   • Alcohol use: Yes     Comment: occasionally   • Drug use: No     Comment: chronic prescribed Percocet/Norco since 2016   • Sexual activity: Defer              Allergies   Allergen Reactions   • Morphine Hives and GI Intolerance     Duramorph; states is able to tolerate regular Morphine   • Latex Rash              Current Outpatient Medications:   •  albuterol sulfate  (90 Base) MCG/ACT inhaler, Inhale 2 puffs Every 4 (Four) Hours As Needed for Wheezing., Disp: , Rfl:   •  citalopram (CeleXA) 20 MG tablet, 20 mg Daily., Disp: , Rfl: 0  •  diclofenac (VOLTAREN) 75 MG EC tablet, Take 75 mg by mouth 2 (Two) Times a Day., Disp: , Rfl:   •  gabapentin (NEURONTIN) 600 MG tablet, Take 300 mg by mouth 3 (Three) Times a Day. Only takes BID, Disp: , Rfl: 0  •  oxyCODONE-acetaminophen (LYNOX) 7.5-300 MG per tablet, Take 1 tablet by mouth Every 8 (Eight) Hours As Needed., Disp: , Rfl:   •  valACYclovir (VALTREX) 1000 MG tablet, take 2 tablets by mouth every 12 hours for 2 DOSES if needed for fever BLISTER, Disp: , Rfl: 0         Review of Systems   Constitutional: Negative for activity change, appetite change, chills, diaphoresis, fatigue, fever and unexpected weight change.   HENT: Negative for congestion, dental problem, drooling, ear discharge, ear pain, facial swelling, hearing loss, mouth sores, nosebleeds, postnasal drip, rhinorrhea, sinus pressure, sneezing, sore throat, tinnitus, trouble swallowing and voice change.    Eyes: Negative for photophobia, pain, discharge, redness, itching and visual disturbance.   Respiratory: Negative for apnea, cough, choking, chest tightness, shortness of breath, wheezing  "and stridor.    Cardiovascular: Negative for chest pain, palpitations and leg swelling.   Gastrointestinal: Positive for abdominal pain and nausea. Negative for abdominal distention, anal bleeding, blood in stool, constipation, diarrhea, rectal pain and vomiting.   Endocrine: Negative for cold intolerance, heat intolerance, polydipsia, polyphagia and polyuria.   Genitourinary: Negative for decreased urine volume, difficulty urinating, dysuria, enuresis, flank pain, frequency, genital sores, hematuria and urgency.   Musculoskeletal: Positive for back pain. Negative for arthralgias, gait problem, joint swelling, myalgias, neck pain and neck stiffness.   Skin: Negative for color change, pallor, rash and wound.   Allergic/Immunologic: Negative for environmental allergies, food allergies and immunocompromised state.   Neurological: Positive for numbness and headaches. Negative for dizziness, tremors, seizures, syncope, facial asymmetry, speech difficulty, weakness and light-headedness.   Hematological: Negative for adenopathy. Does not bruise/bleed easily.   Psychiatric/Behavioral: Negative for agitation, behavioral problems, confusion, decreased concentration, dysphoric mood, hallucinations, self-injury, sleep disturbance and suicidal ideas. The patient is not nervous/anxious and is not hyperactive.    All other systems reviewed and are negative.              Vitals:    07/07/20 1418   Temp: 97.5 °F (36.4 °C)   TempSrc: Temporal   Weight: 110 kg (242 lb 6.4 oz)   Height: 165.1 cm (65\")               EXAMINATION: BMI 40.3, weight 242 pounds.  Limited range of motion.  Diminished reflexes in lower extremities.  Straight leg raising negative.  No weakness, sensory loss or reflex asymmetry.            MEDICAL DECISION MAKING: The myelogram and post myelogram CT scan are consistent with a incomplete fusion of L5-S1.  The sacral screws are fractured.           ASSESSMENT/DISPOSITION: She has no neurological dysfunction in the " context of nerve root or spinal cord compression.  The justification for surgical intervention is predicated solely on pain.  In my opinion that is not in her best interest and that a attempt for re-fusion is not an assurance or guarantee that it would alleviate her symptoms.  She has many factors that need to be dealt with prior to consideration of surgery.  She is morbidly obese which renders the outcome of the surgery quite problematic.  Also she must quit smoking.     I have made appointment for her to see bariatric surgery for consideration of a sleeve.  She is attempting to lose weight on many occasions to no avail.  Hopefully, if her weight can be controlled her pain would be markedly better.  She will call me after she sees the bariatric surgeon.              I APPRECIATE THE OPPORTUNITY OF THIS REFERRAL. PLEASE CALL IF ANY       QUESTIONS [phone]        I have received verbal consent from the patient to receive care via telehealth.

## 2020-07-07 NOTE — PATIENT INSTRUCTIONS
Call Dr. Hagan on a Monday or Tuesday and leave a message.     He will call you back at the end of the day as soon as he can.     [phone] [facility]   [phone] - [place]  [phone] - [place]    Call 1 month

## 2020-08-07 ENCOUNTER — TELEPHONE (OUTPATIENT)
Dept: NEUROSURGERY | Facility: CLINIC | Age: 30
End: 2020-08-07

## 2020-08-07 NOTE — TELEPHONE ENCOUNTER
----- Message from Nila Bond sent at 8/6/2020  3:39 PM EDT -----  Calling in 1 month later. She is still having back pain, stiffness and pain in both hips. She is having trouble getting around still.     She has some questions for you.    You can reach her at [phone]

## 2020-10-15 ENCOUNTER — TELEPHONE (OUTPATIENT)
Dept: NEUROSURGERY | Facility: CLINIC | Age: 30
End: 2020-10-15

## 2020-10-15 NOTE — TELEPHONE ENCOUNTER
Provider:  Bentley  Caller: patient  Time of call:   3:42  Phone #:  [phone]  Surgery:  no  Surgery Date:    Last visit:   07/07/20  Next visit: None    NICKI:         Reason for call:    Pt called to give Dr. Hagan a monthly update.  She said her pain is worse in her low back and both hips.    He wanted her to see Dr. Hsieh for gastric sleeve, however she lost the information sheet he gave her.  She would like to obtain that paperwork again. (address is incorrect in epic.) will need to have registration update this before we mail anything out.) TY

## 2020-11-02 ENCOUNTER — TELEPHONE (OUTPATIENT)
Dept: NEUROSURGERY | Facility: CLINIC | Age: 30
End: 2020-11-02

## 2020-11-02 NOTE — TELEPHONE ENCOUNTER
Provider:  Bentley  Caller: patient  Time of call:   3:43  Phone #:  [phone]  Surgery:  no  Surgery Date:    Last visit:   07/07/20  Next visit: None    NICKI:         Reason for call:     Patient called and said she is to touch base with Dr. Hagan monthly.    Please call at your convenience.

## 2020-11-04 ENCOUNTER — TELEPHONE (OUTPATIENT)
Dept: NEUROSURGERY | Facility: CLINIC | Age: 30
End: 2020-11-04

## 2021-09-30 ENCOUNTER — OFFICE VISIT (OUTPATIENT)
Dept: NEUROSURGERY | Facility: CLINIC | Age: 31
End: 2021-09-30

## 2021-09-30 VITALS
DIASTOLIC BLOOD PRESSURE: 80 MMHG | BODY MASS INDEX: 26.89 KG/M2 | HEIGHT: 65 IN | SYSTOLIC BLOOD PRESSURE: 138 MMHG | TEMPERATURE: 97.3 F | WEIGHT: 161.4 LBS

## 2021-09-30 DIAGNOSIS — S32.009K LUMBAR PSEUDOARTHROSIS: ICD-10-CM

## 2021-09-30 DIAGNOSIS — M53.2X6 SPINAL INSTABILITY OF LUMBAR REGION: Primary | ICD-10-CM

## 2021-09-30 PROCEDURE — 99213 OFFICE O/P EST LOW 20 MIN: CPT | Performed by: PHYSICIAN ASSISTANT

## 2021-09-30 RX ORDER — DIAZEPAM 10 MG/1
10 TABLET ORAL
Qty: 2 TABLET | Refills: 0 | Status: SHIPPED | OUTPATIENT
Start: 2021-09-30 | End: 2022-01-11

## 2021-09-30 RX ORDER — CITALOPRAM 40 MG/1
TABLET ORAL
COMMUNITY
End: 2022-01-11

## 2021-11-12 ENCOUNTER — TELEPHONE (OUTPATIENT)
Dept: NEUROSURGERY | Facility: CLINIC | Age: 31
End: 2021-11-12

## 2021-11-12 NOTE — TELEPHONE ENCOUNTER
Caller: Noni Lipscomb    Relationship to patient: Self    Best call back number:[phone]    Patient is needing: PT CALLED IN TO RESCHEDULE HER APPT. TIMEFRAME IS WITHIN THE 24 HOUR FOR THE WEEKEND-WARM TRANSFERRED TO NETTE WHO SENT HER TO SCHEDULING-PT STATES THAT SHE IS IN THE MIDDLE OF DRIVING AND COULD NOT HEAR THE NUMBER THAT WAS STATED TO CALL BACK FOR SCHEDULING. PT CALLED BACK AND IS REQUESTING SOMEONE TO CALL HER BACK TO RESCHEDULE-PLEASE ADVISE THANK YOU

## 2022-01-11 ENCOUNTER — OFFICE VISIT (OUTPATIENT)
Dept: NEUROSURGERY | Facility: CLINIC | Age: 32
End: 2022-01-11

## 2022-01-11 VITALS
TEMPERATURE: 97.1 F | DIASTOLIC BLOOD PRESSURE: 72 MMHG | BODY MASS INDEX: 27.96 KG/M2 | HEIGHT: 65 IN | SYSTOLIC BLOOD PRESSURE: 118 MMHG | WEIGHT: 167.8 LBS

## 2022-01-11 DIAGNOSIS — M54.50 CHRONIC LOW BACK PAIN, UNSPECIFIED BACK PAIN LATERALITY, UNSPECIFIED WHETHER SCIATICA PRESENT: ICD-10-CM

## 2022-01-11 DIAGNOSIS — M47.816 FACET ARTHRITIS OF LUMBAR REGION: ICD-10-CM

## 2022-01-11 DIAGNOSIS — Z71.6 TOBACCO ABUSE COUNSELING: ICD-10-CM

## 2022-01-11 DIAGNOSIS — M53.2X6 SPINAL INSTABILITY OF LUMBAR REGION: Primary | ICD-10-CM

## 2022-01-11 DIAGNOSIS — S32.009K LUMBAR PSEUDOARTHROSIS: ICD-10-CM

## 2022-01-11 DIAGNOSIS — G89.29 CHRONIC LOW BACK PAIN, UNSPECIFIED BACK PAIN LATERALITY, UNSPECIFIED WHETHER SCIATICA PRESENT: ICD-10-CM

## 2022-01-11 PROCEDURE — 99212 OFFICE O/P EST SF 10 MIN: CPT | Performed by: PHYSICIAN ASSISTANT

## 2022-01-11 RX ORDER — BUPRENORPHINE HYDROCHLORIDE AND NALOXONE HYDROCHLORIDE DIHYDRATE 8; 2 MG/1; MG/1
2 TABLET SUBLINGUAL
COMMUNITY
Start: 2022-01-04

## 2022-01-11 RX ORDER — NALOXONE HYDROCHLORIDE 4 MG/.1ML
SPRAY NASAL
COMMUNITY
Start: 2021-12-03

## 2022-01-11 NOTE — PROGRESS NOTES
Patient: Noni Lipscomb  : 1990  Chart #: [id number]    Date of Service: 2022    CHIEF COMPLAINT: Back and bilateral leg pain    History of Present Illness Ms. Lipscomb is a 31-year-old woman who is well-known to our clinic. Her history is significant for undergoing L5-S1 fusion in  in [place]. She has been evaluated by Dr. Anaya and Dr. Hagan over the last couple years for ongoing pain in her low back and left lower extremity, predominantly. Imaging has been significant for bilateral sacral screw fractures and likely pseudoarthrosis. Given her tobacco usage and obesity, revision surgery has not been recommended.    Over the last year, patient has worked on weight loss. She has reported lost 81 pounds. She complains of pain in her low back that radiates into her legs bilaterally.  She describes numbness in her shins bilaterally.  Her symptoms are bothersome in all positions.  Nothing significantly alleviates pain.  She denies new weakness or bowel or bladder dysfunction. She has pursued physical therapy and epidural injections to little avail. She continues to smoke.      Past Medical History:   Diagnosis Date   • Anxiety 2019   • Asthma    • Chronic back pain 2019    s/p lumbar fusion   • Chronic narcotic use 2019    Percocet for chronic back pain   • History of abnormal cervical Pap smear    • Migraines     since    • Obesity, Class III, BMI 40-49.9 (morbid obesity) ([facility]) 2019   • Spondylolisthesis          Current Outpatient Medications:   •  buprenorphine-naloxone (SUBOXONE) 8-2 MG per SL tablet, DISSOLVE 3 TABLETS UNDER THE TONGUE ONCE DAILY AS DIRECTED, Disp: , Rfl:   •  Narcan 4 MG/0.1ML nasal spray, , Disp: , Rfl:     Past Surgical History:   Procedure Laterality Date   • CERVICAL BIOPSY     •  SECTION      x 3   • DILATATION AND CURETTAGE     • DILATATION AND CURETTAGE  2019    Incomplete SAB   • INTERVENTIONAL RADIOLOGY PROCEDURE N/A 2019    Procedure:  "myelogram lumbar spine;  Surgeon: Elkin Benz MD;  Location:  [facility] C[facility] INVASIVE LOCATION;  Service: Interventional Radiology   • INTERVENTIONAL RADIOLOGY PROCEDURE N/A 2020    Procedure: IR myelogram lumbar with fluoroscopy;  Surgeon: Elkin Benz MD;  Location:  [facility] INVASIVE LOCATION;  Service: Interventional Radiology;  Laterality: N/A;   • LUMBAR DISC SURGERY     • TONSILLECTOMY     • WISDOM TOOTH EXTRACTION         Social History     Socioeconomic History   • Marital status: Significant Other   Tobacco Use   • Smoking status: Current Some Day Smoker     Packs/day: 0.00     Types: Cigarettes     Last attempt to quit: 2019     Years since quittin.4   • Smokeless tobacco: Never Used   • Tobacco comment: 1-3pks a week   Substance and Sexual Activity   • Alcohol use: Yes     Comment: occasionally   • Drug use: No     Comment: chronic prescribed Percocet/Norco since    • Sexual activity: Defer         Review of Systems   Constitutional: Negative.    Eyes: Negative.    Respiratory: Negative.    Cardiovascular: Negative.    Gastrointestinal: Negative.    Endocrine: Negative.    Genitourinary: Positive for pelvic pain.   Musculoskeletal: Positive for arthralgias and back pain.   Skin: Negative.    Allergic/Immunologic: Negative.    Neurological: Positive for headaches.   Hematological: Negative.    Psychiatric/Behavioral: Negative.        Objective   Vital Signs: Blood pressure 118/72, temperature 97.1 °F (36.2 °C), temperature source Infrared, height 165.1 cm (65\"), weight 76.1 kg (167 lb 12.8 oz), unknown if currently breastfeeding.  Physical Exam  Vitals and nursing note reviewed.   Constitutional:       General: She is not in acute distress.     Appearance: She is well-developed.   HENT:      Head: Normocephalic and atraumatic.   Eyes:      Pupils: Pupils are equal, round, and reactive to light.   Cardiovascular:      Heart sounds: Normal heart sounds.   Pulmonary:      Breath " sounds: Normal breath sounds.   Psychiatric:         Behavior: Behavior normal.         Thought Content: Thought content normal.     Musculoskeletal:     Strength is intact in upper and lower extremities to direct testing.     Station and gait are normal.  Neurologic:     Muscle tone is normal throughout.     Coordination is intact.     Sensation is intact to light touch throughout.     Patient is oriented to person, place, and time.       Independent review of radiographic imaging: Images on disc were unable to be read today    Assessment/Plan   Diagnosis:  1. History of L5-S1 fusion  2. Lumbar pseudoarthrosis  3. Chronic back pain  4. Tobacco abuse    Medical Decision Making: Unfortunately patient's disc did not have the images that were needed to be reviewed.  She will have to reschedule to review imaging and determine if she is a surgical candidate.  I once again encouraged her to work on smoking cessation.              Diagnoses and all orders for this visit:    1. Spinal instability of lumbar region (Primary)    2. Lumbar pseudoarthrosis    3. Facet arthritis of lumbar region    4. Chronic low back pain, unspecified back pain laterality, unspecified whether sciatica present    5. Tobacco abuse counseling    6. BMI 27.0-27.9,adult                        Patient's Body mass index is 27.92 kg/m². indicating that she is overweight (BMI 25-29.9). Obesity-related health conditions include the following: none. Obesity is unchanged. BMI is is above average; BMI management plan is completed. We discussed portion control and increasing exercise..         Frieda Gambino PA-C  Patient Care Team:  Berta Ferrer APRN as PCP - General (Family Medicine)

## 2022-04-29 ENCOUNTER — TELEPHONE (OUTPATIENT)
Dept: NEUROSURGERY | Facility: CLINIC | Age: 32
End: 2022-04-29
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
## 2022-05-31 ENCOUNTER — TELEPHONE (OUTPATIENT)
Dept: NEUROSURGERY | Facility: CLINIC | Age: 32
End: 2022-05-31

## 2022-05-31 ENCOUNTER — OFFICE VISIT (OUTPATIENT)
Dept: NEUROSURGERY | Facility: CLINIC | Age: 32
End: 2022-05-31

## 2022-05-31 DIAGNOSIS — R20.0 NUMBNESS: Primary | ICD-10-CM

## 2022-05-31 DIAGNOSIS — S32.009K LUMBAR PSEUDOARTHROSIS: Primary | ICD-10-CM

## 2022-05-31 DIAGNOSIS — Z00.6 EXAM FOR CLINICAL RESEARCH: ICD-10-CM

## 2022-05-31 PROCEDURE — 99213 OFFICE O/P EST LOW 20 MIN: CPT | Performed by: PHYSICIAN ASSISTANT

## 2022-05-31 RX ORDER — FLUOXETINE HYDROCHLORIDE 20 MG/1
CAPSULE ORAL
COMMUNITY
End: 2022-09-16

## 2022-05-31 RX ORDER — TOPIRAMATE 100 MG/1
TABLET, FILM COATED ORAL
COMMUNITY
End: 2022-07-14

## 2022-05-31 RX ORDER — ALBUTEROL SULFATE 90 UG/1
AEROSOL, METERED RESPIRATORY (INHALATION)
COMMUNITY

## 2022-05-31 RX ORDER — LORATADINE 10 MG/1
TABLET ORAL
COMMUNITY

## 2022-05-31 NOTE — TELEPHONE ENCOUNTER
DOCUMENTATION ONLY: I have scanned in the patients  MRI and her Xray. I gave the disc back to her before she left.

## 2022-06-01 VITALS
DIASTOLIC BLOOD PRESSURE: 62 MMHG | SYSTOLIC BLOOD PRESSURE: 102 MMHG | TEMPERATURE: 98.3 F | HEIGHT: 66 IN | BODY MASS INDEX: 26.84 KG/M2 | WEIGHT: 167 LBS

## 2022-06-22 ENCOUNTER — TELEPHONE (OUTPATIENT)
Dept: NEUROSURGERY | Facility: CLINIC | Age: 32
End: 2022-06-22

## 2022-06-22 NOTE — TELEPHONE ENCOUNTER
Caller: Noni Lipscomb    Relationship to patient: Self    Best call back number: [phone]    Chief complaint:WOULD LIKE TO SEE THE M.D    Type of visit:FOLLOW UP    Requested date: ASAP    If rescheduling, when is the original appointment:NA    Additional notes:PT CALLED BACK TO SET UP AN APPT. PT IS WANTING TO BE SCHEDULED WITH THE M.D PT IS WANTING TO DISCUSS SURGERY-SHE IS NOT WANTING TO SCHEDULE WITH PAC. PT DID COMPLETE HER MRI AND PT DOES HAVE HER DISC. PLEASE CONTACT PT FOR AN APPT. WITH THE M.D IF POSSIBLE-THANK YOU

## 2022-06-22 NOTE — TELEPHONE ENCOUNTER
Caller: ASHLEY CHAPMAN    Relationship to patient: SELF    Best call back number: [phone]    Type of visit: F/U EXT    Requested date: AS SOON AS POSSIBLE    Additional notes: PT CALLED AND STATES THAT SHE HAD HER MRI C SPINE COMPLETED AND IS AWAITING HER FOLLOW UP APPT.      PLEASE CALL PT  THANK YOU

## 2022-06-23 NOTE — TELEPHONE ENCOUNTER
With new imaging will it be okay to schedule with a physician? If so which one. It looks like the last MD she has been seen by was .

## 2022-06-24 NOTE — TELEPHONE ENCOUNTER
Patient has been scheduled to see Dr. Mendez on 7/14/2022.   Attending Attestation (For Attendings USE Only)...

## 2022-07-14 ENCOUNTER — OFFICE VISIT (OUTPATIENT)
Dept: NEUROSURGERY | Facility: CLINIC | Age: 32
End: 2022-07-14

## 2022-07-14 VITALS
TEMPERATURE: 97.3 F | WEIGHT: 166.6 LBS | HEIGHT: 66 IN | DIASTOLIC BLOOD PRESSURE: 80 MMHG | SYSTOLIC BLOOD PRESSURE: 122 MMHG | BODY MASS INDEX: 26.78 KG/M2

## 2022-07-14 DIAGNOSIS — G89.29 CHRONIC MIDLINE LOW BACK PAIN WITHOUT SCIATICA: Primary | ICD-10-CM

## 2022-07-14 DIAGNOSIS — M54.50 CHRONIC MIDLINE LOW BACK PAIN WITHOUT SCIATICA: Primary | ICD-10-CM

## 2022-07-14 PROCEDURE — 99214 OFFICE O/P EST MOD 30 MIN: CPT | Performed by: STUDENT IN AN ORGANIZED HEALTH CARE EDUCATION/TRAINING PROGRAM

## 2022-07-14 RX ORDER — AMITRIPTYLINE HYDROCHLORIDE 10 MG/1
10 TABLET, FILM COATED ORAL DAILY
COMMUNITY
Start: 2022-06-28

## 2022-07-14 RX ORDER — TOPIRAMATE 25 MG/1
25 TABLET ORAL 2 TIMES DAILY
COMMUNITY
Start: 2022-06-28 | End: 2022-09-16

## 2022-07-14 NOTE — PROGRESS NOTES
Patient: Noni Lipscomb  : 1990    Primary Care Provider: Tori Burch MD    Requesting Provider: As above      Chief Complaint: Back Pain and Numbness (BUE, BLE and face)      History of Present Illness: This is a 31 y.o. female who underwent an L5-S1 fusion in  for likely bilateral pars defects.  At that time, she had significant back pain.  She states after the surgery she had no significant improvement in her back pain.  She been dealing with this for several years.  She is currently taking Suboxone.  She denies any significant consistent radicular pain in her legs.  She has numbness in her legs but denies any significant weakness.  She has not seen a pain management specialist in quite some time.  When she was seen last by her PA, she was complaining of numbness and tingling in her arms.  She was sent for cervical MRI and returns for follow-up.    PMHX  Allergies:  Allergies   Allergen Reactions   • Adhesive Tape Other (See Comments)   • Morphine Hives and GI Intolerance     Duramorph; states is able to tolerate regular Morphine   • Latex Rash     Medications    Current Outpatient Medications:   •  albuterol sulfate  (90 Base) MCG/ACT inhaler, ProAir HFA 90 mcg/actuation aerosol inhaler  INHALE 2 PUFFS BY MOUTH EVERY 4 HOURS AS NEEDED, Disp: , Rfl:   •  amitriptyline (ELAVIL) 10 MG tablet, Take 10 mg by mouth Daily., Disp: , Rfl:   •  buprenorphine-naloxone (SUBOXONE) 8-2 MG per SL tablet, DISSOLVE 3 TABLETS UNDER THE TONGUE ONCE DAILY AS DIRECTED, Disp: , Rfl:   •  FLUoxetine (PROzac) 20 MG capsule, fluoxetine 20 mg capsule  TAKE 1 CAPSULE BY MOUTH EVERY DAY, Disp: , Rfl:   •  loratadine (CLARITIN) 10 MG tablet, loratadine 10 mg tablet  TAKE 1 TABLET BY MOUTH EVERY DAY, Disp: , Rfl:   •  Narcan 4 MG/0.1ML nasal spray, , Disp: , Rfl:   •  topiramate (TOPAMAX) 25 MG tablet, Take 25 mg by mouth 2 (Two) Times a Day., Disp: , Rfl:   Past Medical History:  Past Medical History:    Diagnosis Date   • Anxiety 2019   • Asthma    • Chronic back pain 2019    s/p lumbar fusion   • Chronic narcotic use 2019    Percocet for chronic back pain   • History of abnormal cervical Pap smear    • Migraines     since 2014   • Obesity, Class III, BMI 40-49.9 (morbid obesity) ([facility]) 2019   • Spondylolisthesis      Past Surgical History:  Past Surgical History:   Procedure Laterality Date   • CERVICAL BIOPSY     •  SECTION      x 3   • DILATATION AND CURETTAGE     • DILATATION AND CURETTAGE  2019    Incomplete SAB   • INTERVENTIONAL RADIOLOGY PROCEDURE N/A 2019    Procedure: myelogram lumbar spine;  Surgeon: Elkin Benz MD;  Location:  [facility];  Service: Interventional Radiology   • INTERVENTIONAL RADIOLOGY PROCEDURE N/A 2020    Procedure: IR myelogram lumbar with fluoroscopy;  Surgeon: Elkin Benz MD;  Location:  [facility];  Service: Interventional Radiology;  Laterality: N/A;   • LUMBAR DISC SURGERY     • TONSILLECTOMY     • WISDOM TOOTH EXTRACTION       Social Hx:  Social History     Tobacco Use   • Smoking status: Current Some Day Smoker     Packs/day: 0.00     Types: Cigarettes     Last attempt to quit: 2019     Years since quittin.9   • Smokeless tobacco: Never Used   • Tobacco comment: 1-3pks a week   Substance Use Topics   • Alcohol use: Yes     Comment: occasionally   • Drug use: No     Comment: chronic prescribed Percocet/Norco since      Family Hx:  Family History   Problem Relation Age of Onset   • Hyperlipidemia Mother    • Heart disease Paternal Grandfather      Review of Systems:        Review of Systems   Constitutional: Negative for activity change, appetite change, chills, diaphoresis, fatigue, fever and unexpected weight change.   HENT: Negative for congestion, dental problem, drooling, ear discharge, ear pain, facial swelling, hearing loss, mouth sores, nosebleeds, postnasal drip, rhinorrhea, sinus  "pressure, sneezing, sore throat, tinnitus, trouble swallowing and voice change.    Eyes: Negative for photophobia, pain, discharge, redness, itching and visual disturbance.   Respiratory: Negative for apnea, cough, choking, chest tightness, shortness of breath, wheezing and stridor.    Cardiovascular: Negative for chest pain, palpitations and leg swelling.   Gastrointestinal: Negative for abdominal distention, abdominal pain, anal bleeding, blood in stool, constipation, diarrhea, nausea, rectal pain and vomiting.   Endocrine: Negative for cold intolerance, heat intolerance, polydipsia, polyphagia and polyuria.   Genitourinary: Negative for decreased urine volume, difficulty urinating, dysuria, enuresis, flank pain, frequency, genital sores, hematuria and urgency.   Musculoskeletal: Positive for arthralgias and gait problem. Negative for joint swelling, myalgias, neck pain and neck stiffness.   Skin: Negative for color change, pallor, rash and wound.   Allergic/Immunologic: Negative for environmental allergies, food allergies and immunocompromised state.   Neurological: Negative for dizziness, tremors, seizures, syncope, facial asymmetry, speech difficulty, weakness, light-headedness, numbness and headaches.   Hematological: Negative for adenopathy. Does not bruise/bleed easily.   Psychiatric/Behavioral: Negative for agitation, behavioral problems, confusion, decreased concentration, dysphoric mood, hallucinations, self-injury, sleep disturbance and suicidal ideas. The patient is not nervous/anxious and is not hyperactive.         Physical Exam:   /80 (BP Location: Right arm, Patient Position: Sitting, Cuff Size: Adult)   Temp 97.3 °F (36.3 °C) (Infrared)   Ht 166.4 cm (65.5\")   Wt 75.6 kg (166 lb 9.6 oz)   BMI 27.30 kg/m²   Awake, alert and oriented x 3  Speech f/c  Opens eyes spont  Pupils 3 mm rx bilaterally  Extraocular muscles intact bilaterally  Normal sensation to light touch in all 3 distributions " of CN V bilaterally  Face symmetric bilaterally  Tongue midline  5/5 in all 4 ext  Normal sensation to light touch in all 4 ext  2+DTR's  No ramos's or clonus bilaterally  No pronator drift or dysmetria  Gait not assessed    Diagnostic Studies:  All neurological imaging studies were independently reviewed unless stated otherwise    Assessment/Plan:  This is a 31 y.o. female presenting chronic lower back pain after an L5-S1 fusion in 2009.  In reviewing the patient's lumbar imaging, she has no significant nerve root compression on her MRI.  Her flexion-extension x-rays show no abnormal motion.  I do not see any obvious evidence that she has failed fusion at this level currently.  With regards to her arm numbness and tingling.  Her cervical MRI shows no central or neuroforaminal stenosis at any level.  I do not see a role for surgical intervention for her back pain.  I am going to refer her to pain management for further evaluation.  We will not schedule any further follow-up with me.    There are no diagnoses linked to this encounter.    Quinten Mendez MD  07/14/22  16:05 EDT

## 2022-09-16 ENCOUNTER — OFFICE VISIT (OUTPATIENT)
Dept: NEUROSURGERY | Facility: CLINIC | Age: 32
End: 2022-09-16

## 2022-09-16 VITALS
WEIGHT: 176 LBS | BODY MASS INDEX: 28.28 KG/M2 | HEIGHT: 66 IN | SYSTOLIC BLOOD PRESSURE: 107 MMHG | DIASTOLIC BLOOD PRESSURE: 54 MMHG | HEART RATE: 53 BPM

## 2022-09-16 DIAGNOSIS — G89.29 CHRONIC MIDLINE LOW BACK PAIN WITHOUT SCIATICA: ICD-10-CM

## 2022-09-16 DIAGNOSIS — M54.50 CHRONIC MIDLINE LOW BACK PAIN WITHOUT SCIATICA: ICD-10-CM

## 2022-09-16 DIAGNOSIS — Z98.1 HISTORY OF LUMBAR FUSION: ICD-10-CM

## 2022-09-16 DIAGNOSIS — M51.36 DDD (DEGENERATIVE DISC DISEASE), LUMBAR: Primary | ICD-10-CM

## 2022-09-16 PROCEDURE — 99214 OFFICE O/P EST MOD 30 MIN: CPT | Performed by: PHYSICIAN ASSISTANT

## 2022-09-16 RX ORDER — FLUOXETINE HYDROCHLORIDE 40 MG/1
CAPSULE ORAL
COMMUNITY
Start: 2022-08-03

## 2022-09-16 RX ORDER — TOPIRAMATE 50 MG/1
50 TABLET, FILM COATED ORAL 2 TIMES DAILY
COMMUNITY
Start: 2022-09-03

## 2022-09-16 NOTE — PROGRESS NOTES
"Chief Complaint  Back Pain and Numbness (Bilateral arms and legs to toes)    Subjective          Noni Jovita Lipscomb who is a 31 y.o. year old female who presents to [facility] for Evaluation of the Spine.     The patient complains of pain located in the Lumbar Spine.  Patients states the pain has been present for \"as long as I can remember\".  The pain came on gradually.  The pain scaled level is 3.  The pain does not radiate. Sometimes pain shoots into the legs, but is non-specific.  The pain is constant and waxing/waning and described as sharp.  The pain is worse at no particular time of day. Patient states nothing improves the pain.  Patient states every day activity makes the pain worse.    Associated Symptoms Include: Numbness into the legs to the toes.  Conservative Interventions Include: Pain Medications that were not very effective., NSAIDs that were not very effective. and SI joint injections which was ineffective.    Was this the result of an injury or accident?: No    History of Previous Spinal Surgery?: Yes.  Lumbar, Date 2009, lumbar fusion at L5-S1     reports that she has been smoking cigarettes. She has been smoking about 0.00 packs per day. She has never used smokeless tobacco.    Review of Systems   Musculoskeletal: Positive for back pain.   Neurological: Positive for numbness.        Objective   Vital Signs:   /54   Pulse 53   Ht 166.4 cm (65.5\")   Wt 79.8 kg (176 lb)   BMI 28.84 kg/m²       Physical Exam  Constitutional:       Appearance: Normal appearance.   Pulmonary:      Effort: Pulmonary effort is normal.   Musculoskeletal:         General: Tenderness (midline lumbar spine, right lumbar paraspinals) present.      Comments: SLR on left causes pain down posterior left leg to the calf,  SLR on the right causes pain in the anterior right thigh.   Neurological:      General: No focal deficit present.      Mental Status: She is alert and " oriented to person, place, and time.      Sensory: Sensory deficit (mild right thigh area) present.      Motor: No weakness.      Deep Tendon Reflexes: Reflexes normal.   Psychiatric:         Mood and Affect: Mood normal.         Behavior: Behavior normal.        Neurologic Exam     Mental Status   Oriented to person, place, and time.        Result Review     I have personally reviewed the cervical MRI without contrast from 6/21/2022 which shows multilevel degenerative disc disease without significant central canal or foraminal stenosis. There is also a CT of cervical spine from 4/27/22 which does not show any stenosis.    I have personally reviewed the MRI of lumbar spine without contrast from 11/24/2021 which shows previous L5-S1 lumbar fusion, there is some mild stenosis at L4-L5 without nerve root impingement. There was CT of lumbar spine from 4/27/22 that did not show any significant narrowing in the central canal. The fusion hardware screw on the right at the S1 level does appear to be fractured.    I have personally reviewed the x-ray with flexion-extension views of the lumbar spine from 11/24/2021 which shows no instability of the previous fusion at L5-S1 or in the lumbar spine in general.     Assessment and Plan    Diagnoses and all orders for this visit:    1. DDD (degenerative disc disease), lumbar (Primary)    2. Chronic midline low back pain without sciatica    3. History of lumbar fusion    Her pain is mostly in the lower back.    She has intermittent non-specific pain in the legs.    She has a previous fusion at L5-S1 with fractured right fixation screw at S1. This appears stable on flexion and extension x-rays. Otherwise, she has some mild narrowing at L4-L5, but no significant nerve compromise.    I do not expect surgery to be appropriate or helpful for her pain.    She is establishing with [facility] in [place] and is going to undergo a trial of MBBs/RFA. I do feel this is likely to help and  encouraged her to continue with this. She reports they have discussed possible SCS in the future. This may also be appropriate for her, especially considering that spinal surgery is not likely to help her.    The patient was counseled on basic recommendations for the reduction and prevention of back, neck, or spine pain in association with spinal disorders, including: cessation/avoidance of nicotine use, maintenance of a healthy BMI and weight, focusing on building/maintaining core strength through core exercise, and avoidance of activities which worsen the pain. The patient will monitor for changes in symptoms and notify our clinic of these changes as needed.    She will follow-up here PRN.    Follow Up   Return if symptoms worsen or fail to improve.  Patient was given instructions and counseling regarding her condition or for health maintenance advice. Please see specific information pulled into the AVS if appropriate.

## 2022-09-28 ENCOUNTER — TELEPHONE (OUTPATIENT)
Dept: NEUROSURGERY | Facility: CLINIC | Age: 32
End: 2022-09-28

## 2022-09-28 NOTE — TELEPHONE ENCOUNTER
Caller: WILL @ DR. GOTTI (REF PROV / PCP)    Relationship: PCP/REFERRING PROVIDER    Best call back number: [phone]    What form or medical record are you requesting: Office Visit with Darius Rosado PA-C (09/16/2022)    Who is requesting this form or medical record from you: PATIENT PCP/REFERRING PROVIDER DR. JASMIN GOTTI    How would you like to receive the form or medical records (pick-up, mail, fax): FAX  If fax, what is the fax number: [phone] ATTN: MED RECORDS    Timeframe paperwork needed: WHENEVER ABLE TO SEND, REFERRING PROVIDER/PCP IS REQUESTING COPY OF LAST OVN.    THANK YOU VERY MUCH.

## (undated) DEVICE — SKIN PREP TRAY W/CHG: Brand: MEDLINE INDUSTRIES, INC.

## (undated) DEVICE — TRY L/P SFTY A/20G